# Patient Record
Sex: FEMALE | Race: WHITE
[De-identification: names, ages, dates, MRNs, and addresses within clinical notes are randomized per-mention and may not be internally consistent; named-entity substitution may affect disease eponyms.]

---
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
## 2020-08-13 ENCOUNTER — HOSPITAL ENCOUNTER (EMERGENCY)
Dept: HOSPITAL 47 - EC | Age: 20
Discharge: NURSING, REHAB OR LONG-TERM CARE | End: 2020-08-13
Payer: COMMERCIAL

## 2020-08-13 VITALS — SYSTOLIC BLOOD PRESSURE: 111 MMHG | DIASTOLIC BLOOD PRESSURE: 74 MMHG | HEART RATE: 70 BPM | TEMPERATURE: 98.1 F

## 2020-08-13 VITALS — RESPIRATION RATE: 18 BRPM
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
## 2020-08-13 DIAGNOSIS — S63.635A: Primary | ICD-10-CM

## 2020-08-13 DIAGNOSIS — F17.200: ICD-10-CM
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
## 2020-08-13 PROCEDURE — 99284 EMERGENCY DEPT VISIT MOD MDM: CPT

## 2020-08-13 NOTE — XR
EXAMINATION TYPE: XR hand complete LT

 

DATE OF EXAM: 8/13/2020

 

COMPARISON: NONE

 

HISTORY: Pain

 

TECHNIQUE: Three views are submitted.

 

FINDINGS:

The osseous structures are intact.  The joint spaces are preserved and there is no acute fracture or 
dislocation.  

 

IMPRESSION:

1.  No definite acute fracture or dislocation if symptoms persist, follow-up study in 7 to 10 days wo
uld be suggested

## 2020-08-13 NOTE — ED
General Adult HPI





- General


Chief complaint: Extremity Injury, Upper


Stated complaint: Hand injury


Time Seen by Provider: 08/13/20 12:15


Source: patient, RN notes reviewed, old records reviewed


Mode of arrival: ambulatory


Limitations: no limitations





- History of Present Illness


Initial comments: 





This is a 19-year-old female who was riding a hoverboard with her kids and she 

fell off it and hurt her left hand.  Patient complains of pain at the fourth 

metacarpal complains of pain at the proximal PIP joint of the fourth finger.  

Patient has a small abrasion on the PIP joint of the fourth finger as well.  

Patient denies any wrist pain.  Patient denies any other injury.  Patient denies

any head or have any neck pain.





- Related Data


                                Home Medications











 Medication  Instructions  Recorded  Confirmed


 


Acetaminophen with Codeine 1 tab PO Q6H PRN 12/18/19 12/18/19





[Tylenol w/codeine #3]   








                                  Previous Rx's











 Medication  Instructions  Recorded


 


Ibuprofen [Motrin] 600 mg PO Q6HR PRN #20 tab 08/13/20











                                    Allergies











Allergy/AdvReac Type Severity Reaction Status Date / Time


 


No Known Allergies Allergy   Verified 08/13/20 12:16














Review of Systems


ROS Statement: 


Those systems with pertinent positive or pertinent negative responses have been 

documented in the HPI.





ROS Other: All systems not noted in ROS Statement are negative.





Past Medical History


Past Medical History: No Reported History


History of Any Multi-Drug Resistant Organisms: None Reported


Past Surgical History: No Surgical Hx Reported


Past Psychological History: No Psychological Hx Reported


Smoking Status: Current every day smoker


Past Alcohol Use History: None Reported


Past Drug Use History: None Reported





General Exam





- General Exam Comments


Initial Comments: 





GENERAL 


Patient is well-developed and well-nourished.  Patient is in mild distress.





EYES


Patient's pupils are equal and round.  Extraocular motion is intact





SKIN


There is a very superficial abrasion to the PIP joint of the fourth finger on 

the left hand





NEURO


The patient is alert and oriented 3





PYSCH


Patient has normal interpersonal interactions.





MUSCULOSKELETAL


Patient's tenderness at the fourth PIP joint of the left hand patient also has 

some tenderness over the fourth metacarpal.


Limitations: no limitations





Course


                                   Vital Signs











  08/13/20 08/13/20





  12:14 14:02


 


Temperature 98.2 F 98.1 F


 


Pulse Rate 75 70


 


Respiratory 18 18





Rate  


 


Blood Pressure 120/75 111/74


 


O2 Sat by Pulse 100 100





Oximetry  














Medical Decision Making





- Medical Decision Making





X-ray of the hand shows no fracture of the fingers or the metacarpals.  Patient 

will have a splint placed on the fourth finger because of the pain at the PIP 

joint that's proximal.  Unable to test ligament laxity of the joint because the 

patient is having somewhat discomfort.





Disposition


Clinical Impression: 


 Finger sprain





Disposition: HOME SELF-CARE


Instructions (If sedation given, give patient instructions):  Hand Sprain (ED)


Prescriptions: 


Ibuprofen [Motrin] 600 mg PO Q6HR PRN #20 tab


 PRN Reason: For pain   


Is patient prescribed a controlled substance at d/c from ED?: No


Referrals: 


Dom Celaya MD [Primary Care Provider] - 1-2 days


Time of Disposition: 13:34

## 2021-05-23 ENCOUNTER — HOSPITAL ENCOUNTER (EMERGENCY)
Dept: HOSPITAL 47 - EC | Age: 21
Discharge: HOME | End: 2021-05-23
Payer: COMMERCIAL

## 2021-05-23 VITALS — HEART RATE: 97 BPM | TEMPERATURE: 98.4 F | DIASTOLIC BLOOD PRESSURE: 87 MMHG | SYSTOLIC BLOOD PRESSURE: 118 MMHG

## 2021-05-23 VITALS — RESPIRATION RATE: 18 BRPM

## 2021-05-23 DIAGNOSIS — T40.2X1A: Primary | ICD-10-CM

## 2021-05-23 PROCEDURE — 99284 EMERGENCY DEPT VISIT MOD MDM: CPT

## 2021-05-23 NOTE — ED
Overdose HPI





- General


Chief Complaint: Overdose


Stated Complaint: Overdose


Time Seen by Provider: 05/23/21 05:06


Source: patient


Mode of arrival: ambulatory


Limitations: no limitations





- Related Data


                                Home Medications











 Medication  Instructions  Recorded  Confirmed


 


Acetaminophen with Codeine 1 tab PO Q6H PRN 12/18/19 12/18/19





[Tylenol w/codeine #3]   








                                  Previous Rx's











 Medication  Instructions  Recorded


 


Ibuprofen [Motrin] 600 mg PO Q6HR PRN #20 tab 08/13/20











                                    Allergies











Allergy/AdvReac Type Severity Reaction Status Date / Time


 


No Known Allergies Allergy   Verified 05/23/21 05:06














Review of Systems


ROS Statement: 


Those systems with pertinent positive or pertinent negative responses have been 

documented in the HPI.





ROS Other: All systems not noted in ROS Statement are negative.





Past Medical History


Past Medical History: No Reported History


History of Any Multi-Drug Resistant Organisms: None Reported


Past Surgical History: No Surgical Hx Reported


Past Psychological History: No Psychological Hx Reported


Smoking Status: Current every day smoker


Past Alcohol Use History: None Reported


Past Drug Use History: Opiates, Prescription Drug Abuse





General Exam


Limitations: no limitations





Course





                                   Vital Signs











  05/23/21





  05:03


 


Temperature 98.6 F


 


Pulse Rate 94


 


Respiratory 18





Rate 


 


Blood Pressure 138/100


 


O2 Sat by Pulse 99





Oximetry 














Disposition


Clinical Impression: 


 Accidental drug overdose, Drug overdose, Poisoning by opiates and related 

narcotics, other





Disposition: HOME SELF-CARE


Condition: Good


Instructions (If sedation given, give patient instructions):  Adult Overdose 

(ED)


Is patient prescribed a controlled substance at d/c from ED?: No


Referrals: 


Dom Celaya MD [Primary Care Provider] - 1-2 days

## 2021-10-18 ENCOUNTER — HOSPITAL ENCOUNTER (EMERGENCY)
Dept: HOSPITAL 47 - EC | Age: 21
LOS: 1 days | Discharge: HOME | End: 2021-10-19
Payer: COMMERCIAL

## 2021-10-18 DIAGNOSIS — L25.9: Primary | ICD-10-CM

## 2021-10-18 DIAGNOSIS — T50.905A: ICD-10-CM

## 2021-10-18 DIAGNOSIS — Z72.89: ICD-10-CM

## 2021-10-18 DIAGNOSIS — F11.10: ICD-10-CM

## 2021-10-18 DIAGNOSIS — F17.200: ICD-10-CM

## 2021-10-18 DIAGNOSIS — L50.9: ICD-10-CM

## 2021-10-18 PROCEDURE — 99282 EMERGENCY DEPT VISIT SF MDM: CPT

## 2021-10-19 VITALS
SYSTOLIC BLOOD PRESSURE: 134 MMHG | TEMPERATURE: 97.9 F | DIASTOLIC BLOOD PRESSURE: 84 MMHG | RESPIRATION RATE: 18 BRPM | HEART RATE: 78 BPM

## 2021-10-19 NOTE — ED
Skin/Abscess/FB HPI





- General


Chief complaint: Skin/Abscess/Foreign Body


Stated complaint: rash


Time Seen by Provider: 10/18/21 23:46


Source: patient, RN notes reviewed, old records reviewed


Mode of arrival: ambulatory


Limitations: no limitations





- History of Present Illness


Initial comments: 





This is a 20-year-old female DF for evaluation of diffuse body rash.  Started 

this morning is progressed throughout the day.  No modifying factors at home no 

new medications no change in symptoms exposures or rash.  No recent camping or 

travel history.  No other complaints


MD complaint: rash


-: days(s)


Tetanus Up to Date: yes


Location: generalized


Severity: moderate


Severity scale (1-10): 4


Quality: aching


Consistency: constant


Improves with: none


Worsens with: none


Context: none


Associated symptoms: denies other symptoms


Treatments Prior to Arrival: none





- Related Data


                                Home Medications











 Medication  Instructions  Recorded  Confirmed


 


Acetaminophen with Codeine 1 tab PO Q6H PRN 12/18/19 12/18/19





[Tylenol w/codeine #3]   








                                  Previous Rx's











 Medication  Instructions  Recorded


 


Ibuprofen [Motrin] 600 mg PO Q6HR PRN #20 tab 08/13/20


 


Famotidine [Pepcid] 40 mg PO BID #28 tablet 10/19/21


 


hydrOXYzine HCL [Atarax] 25 mg PO TID PRN #15 tab 10/19/21


 


predniSONE 50 mg PO DAILY #5 tab 10/19/21











                                    Allergies











Allergy/AdvReac Type Severity Reaction Status Date / Time


 


No Known Allergies Allergy   Verified 10/18/21 23:55














Review of Systems


ROS Statement: 


Those systems with pertinent positive or pertinent negative responses have been 

documented in the HPI.





ROS Other: All systems not noted in ROS Statement are negative.





Past Medical History


Past Medical History: No Reported History


History of Any Multi-Drug Resistant Organisms: None Reported


Past Surgical History: No Surgical Hx Reported


Past Psychological History: No Psychological Hx Reported


Smoking Status: Current every day smoker


Past Alcohol Use History: Occasional


Past Drug Use History: Opiates, Prescription Drug Abuse





General Exam


Limitations: no limitations


General appearance: alert, in no apparent distress


Head exam: Present: atraumatic, normocephalic, normal inspection


Eye exam: Present: normal appearance, PERRL, EOMI.  Absent: scleral icterus, 

conjunctival injection, periorbital swelling


ENT exam: Present: normal exam, mucous membranes moist


Neck exam: Present: normal inspection.  Absent: tenderness, meningismus, 

lymphadenopathy


Respiratory exam: Present: normal lung sounds bilaterally.  Absent: respiratory 

distress, wheezes, rales, rhonchi, stridor


Cardiovascular Exam: Present: regular rate, normal rhythm, normal heart sounds. 

 Absent: systolic murmur, diastolic murmur, rubs, gallop, clicks


GI/Abdominal exam: Present: soft, normal bowel sounds.  Absent: distended, 

tenderness, guarding, rebound, rigid


Extremities exam: Present: normal inspection, full ROM, normal capillary refill.

  Absent: tenderness, pedal edema, joint swelling, calf tenderness


Back exam: Present: normal inspection


Neurological exam: Present: alert, oriented X3, CN II-XII intact


Psychiatric exam: Present: normal affect, normal mood


Skin exam: Present: warm, dry, intact, normal color.  Absent: rash





Course





                                   Vital Signs











  10/18/21





  23:52


 


Temperature 98.7 F


 


Pulse Rate 119 H


 


Respiratory 20





Rate 


 


Blood Pressure 137/79


 


O2 Sat by Pulse 96





Oximetry 














- Reevaluation(s)


Reevaluation #1: 





10/19/21 00:27


Medical record is reviewed


Reevaluation #2: 





10/19/21 00:27


Patient symptoms are improved


Reevaluation #3: 





10/19/21 00:27


Patient informed results and questions answered





Medical Decision Making





- Medical Decision Making





20-year-old female DF for evaluation.  Body rash urticarial ALLERGIC reaction.  

Patient placed on anti-inflammatories antihistamines and can be discharged home





Disposition


Clinical Impression: 


 Allergic reaction to drug, Contact dermatitis, Urticaria





Disposition: HOME SELF-CARE


Condition: Good


Instructions (If sedation given, give patient instructions):  Urticaria (ED)


Prescriptions: 


hydrOXYzine HCL [Atarax] 25 mg PO TID PRN #15 tab


 PRN Reason: Itching


Famotidine [Pepcid] 40 mg PO BID #28 tablet


predniSONE 50 mg PO DAILY #5 tab


Is patient prescribed a controlled substance at d/c from ED?: No


Referrals: 


Dom Celaya MD [Primary Care Provider] - 1-2 days

## 2021-10-20 ENCOUNTER — HOSPITAL ENCOUNTER (EMERGENCY)
Dept: HOSPITAL 47 - EC | Age: 21
Discharge: HOME | End: 2021-10-20
Payer: COMMERCIAL

## 2021-10-20 VITALS
RESPIRATION RATE: 18 BRPM | HEART RATE: 127 BPM | SYSTOLIC BLOOD PRESSURE: 122 MMHG | DIASTOLIC BLOOD PRESSURE: 70 MMHG | TEMPERATURE: 97.8 F

## 2021-10-20 DIAGNOSIS — L23.9: Primary | ICD-10-CM

## 2021-10-20 DIAGNOSIS — Z72.89: ICD-10-CM

## 2021-10-20 DIAGNOSIS — F17.200: ICD-10-CM

## 2021-10-20 PROCEDURE — 99282 EMERGENCY DEPT VISIT SF MDM: CPT

## 2021-10-20 PROCEDURE — 96372 THER/PROPH/DIAG INJ SC/IM: CPT

## 2021-10-20 NOTE — ED
Skin/Abscess/FB HPI





- General


Chief complaint: Skin/Abscess/Foreign Body


Stated complaint: revisit-allergic reaction


Time Seen by Provider: 10/20/21 06:15


Source: patient, RN notes reviewed


Mode of arrival: ambulatory


Limitations: no limitations





- History of Present Illness


Initial comments: 





Patient is a 20-year-old female that presents to emergency department 

complaining of a diffuse urticarial rash.  She was seen yesterday was sent home 

with prednisone famotidine and Benadryl for ALLERGIC reaction.  She notes that 

the rash went away after the first doses of medication.  She notes that she came

back today as it is still present.  Patient was informed that it takes several 

days for medication a fully work.  She notes that she has not having difficulty 

breathing swollen talking.  She was in no apparent distress or pain.  She denied

any chest pain shortness of breath headache nausea vomiting diarrhea 

constipation fever fatigue chills.





- Related Data


                                Home Medications











 Medication  Instructions  Recorded  Confirmed


 


Acetaminophen with Codeine 1 tab PO Q6H PRN 12/18/19 12/18/19





[Tylenol w/codeine #3]   








                                  Previous Rx's











 Medication  Instructions  Recorded


 


Ibuprofen [Motrin] 600 mg PO Q6HR PRN #20 tab 08/13/20


 


Famotidine [Pepcid] 40 mg PO BID #28 tablet 10/19/21


 


hydrOXYzine HCL [Atarax] 25 mg PO TID PRN #15 tab 10/19/21


 


predniSONE 50 mg PO DAILY #5 tab 10/19/21











                                    Allergies











Allergy/AdvReac Type Severity Reaction Status Date / Time


 


No Known Allergies Allergy   Verified 10/18/21 23:55














Review of Systems


ROS Statement: 


Those systems with pertinent positive or pertinent negative responses have been 

documented in the HPI.





ROS Other: All systems not noted in ROS Statement are negative.





Past Medical History


Past Medical History: No Reported History


History of Any Multi-Drug Resistant Organisms: None Reported


Past Surgical History: No Surgical Hx Reported


Past Psychological History: No Psychological Hx Reported


Smoking Status: Current every day smoker


Past Alcohol Use History: Occasional


Past Drug Use History: Opiates, Prescription Drug Abuse





General Exam


Limitations: no limitations


General appearance: alert, in no apparent distress


Head exam: Present: atraumatic, normocephalic, normal inspection


Eye exam: Present: normal appearance, PERRL, EOMI.  Absent: scleral icterus, 

conjunctival injection, periorbital swelling


ENT exam: Present: normal exam, mucous membranes moist


Neck exam: Present: normal inspection


Respiratory exam: Present: normal lung sounds bilaterally.  Absent: respiratory 

distress, wheezes, rales, rhonchi, stridor


Cardiovascular Exam: Present: regular rate, normal rhythm, normal heart sounds. 

 Absent: systolic murmur, diastolic murmur, rubs, gallop, clicks


Extremities exam: Present: normal inspection, full ROM, normal capillary refill.

  Absent: tenderness, pedal edema, joint swelling, calf tenderness


Neurological exam: Present: alert, oriented X3


Psychiatric exam: Present: normal affect, normal mood


Skin exam: Present: warm, dry, intact, normal color, urticaria (Covering upper 

extremities trunk and face, nontender).  Absent: rash





Course


                                   Vital Signs











  10/20/21





  06:07


 


Temperature 97.8 F


 


Pulse Rate 127 H


 


Respiratory 18





Rate 


 


Blood Pressure 122/70


 


O2 Sat by Pulse 98





Oximetry 














Medical Decision Making





- Medical Decision Making





20-year-old female complaining of urticarial rash.  Seen yesterday.


Patient was informed that the medications don't act immediately and it may take 

several days to weeks for rash to fully dissipate.


125 mg Solu-Medrol ordered.


Patient is agreeable with discharge home and continuation of medications with 

follow-up primary care.


case discussed with Dr. Gallagher, patient can discharge home. 





Disposition


Clinical Impression: 


 Contact dermatitis, Urticaria





Disposition: HOME SELF-CARE


Condition: Stable


Instructions (If sedation given, give patient instructions):  Urticaria (ED)


Additional Instructions: 


Please return to the Emergency Department if symptoms worsen or any other 

concerns.


Follow-up with primary care 1-2 days.


Continue medications as prescribed.


Rash may take several days to weeks to fully dissipate.





Is patient prescribed a controlled substance at d/c from ED?: No


Referrals: 


Dom Celaya MD [Primary Care Provider] - 1-2 days


Time of Disposition: 07:08

## 2021-10-25 ENCOUNTER — HOSPITAL ENCOUNTER (OUTPATIENT)
Dept: HOSPITAL 47 - LABWHC1 | Age: 21
Discharge: HOME | End: 2021-10-25
Attending: ALLERGY & IMMUNOLOGY
Payer: COMMERCIAL

## 2021-10-25 DIAGNOSIS — L50.9: Primary | ICD-10-CM

## 2021-10-25 LAB
BASOPHILS # BLD AUTO: 0.05 X 10*3/UL (ref 0–0.1)
BASOPHILS NFR BLD AUTO: 0.3 %
EOSINOPHIL # BLD AUTO: 0.09 X 10*3/UL (ref 0.04–0.35)
EOSINOPHIL NFR BLD AUTO: 0.6 %
ERYTHROCYTE [DISTWIDTH] IN BLOOD BY AUTOMATED COUNT: 4.56 X 10*6/UL (ref 4.1–5.2)
ERYTHROCYTE [DISTWIDTH] IN BLOOD: 13.2 % (ref 11.5–14.5)
HCT VFR BLD AUTO: 42.9 % (ref 37.2–46.3)
HGB BLD-MCNC: 14.8 G/DL (ref 12–15)
LYMPHOCYTES # SPEC AUTO: 4.82 X 10*3/UL (ref 0.9–5)
LYMPHOCYTES NFR SPEC AUTO: 33.7 %
MCH RBC QN AUTO: 32.5 PG (ref 27–32)
MCHC RBC AUTO-ENTMCNC: 34.5 G/DL (ref 32–37)
MCV RBC AUTO: 94.1 FL (ref 80–97)
MONOCYTES # BLD AUTO: 0.9 X 10*3/UL (ref 0.2–1)
MONOCYTES NFR BLD AUTO: 6.3 %
NEUTROPHILS # BLD AUTO: 8.33 X 10*3/UL (ref 1.8–7.7)
NEUTROPHILS NFR BLD AUTO: 58.3 %
PLATELET # BLD AUTO: 288 X 10*3/UL (ref 140–440)
WBC # BLD AUTO: 14.31 X 10*3/UL (ref 4.5–10)

## 2021-10-25 PROCEDURE — 36415 COLL VENOUS BLD VENIPUNCTURE: CPT

## 2021-10-25 PROCEDURE — 86038 ANTINUCLEAR ANTIBODIES: CPT

## 2021-10-25 PROCEDURE — 85025 COMPLETE CBC W/AUTO DIFF WBC: CPT

## 2021-10-25 PROCEDURE — 84443 ASSAY THYROID STIM HORMONE: CPT

## 2021-10-25 PROCEDURE — 80053 COMPREHEN METABOLIC PANEL: CPT

## 2021-10-26 LAB
ALBUMIN SERPL-MCNC: 4.3 G/DL (ref 3.8–4.9)
ALBUMIN/GLOB SERPL: 1.87 G/DL (ref 1.6–3.17)
ALP SERPL-CCNC: 89 U/L (ref 41–126)
ALT SERPL-CCNC: 11 U/L (ref 8–44)
ANION GAP SERPL CALC-SCNC: 13.9 MMOL/L (ref 4–12)
AST SERPL-CCNC: 16 U/L (ref 13–35)
BUN SERPL-SCNC: 6.3 MG/DL (ref 9–27)
BUN/CREAT SERPL: 10.5 RATIO (ref 12–20)
CALCIUM SPEC-MCNC: 9.1 MG/DL (ref 8.7–10.3)
CHLORIDE SERPL-SCNC: 102 MMOL/L (ref 96–109)
CO2 SERPL-SCNC: 23.1 MMOL/L (ref 21.6–31.8)
GLOBULIN SER CALC-MCNC: 2.3 G/DL (ref 1.6–3.3)
GLUCOSE SERPL-MCNC: 99 MG/DL (ref 70–110)
POTASSIUM SERPL-SCNC: 3.2 MMOL/L (ref 3.5–5.5)
PROT SERPL-MCNC: 6.6 G/DL (ref 6.2–8.2)
SODIUM SERPL-SCNC: 139 MMOL/L (ref 135–145)

## 2021-11-15 ENCOUNTER — HOSPITAL ENCOUNTER (EMERGENCY)
Dept: HOSPITAL 47 - EC | Age: 21
LOS: 1 days | Discharge: HOME | End: 2021-11-16
Payer: COMMERCIAL

## 2021-11-15 VITALS
RESPIRATION RATE: 18 BRPM | SYSTOLIC BLOOD PRESSURE: 114 MMHG | TEMPERATURE: 98.7 F | HEART RATE: 83 BPM | DIASTOLIC BLOOD PRESSURE: 72 MMHG

## 2021-11-15 DIAGNOSIS — Z79.52: ICD-10-CM

## 2021-11-15 DIAGNOSIS — Z79.1: ICD-10-CM

## 2021-11-15 DIAGNOSIS — F17.200: ICD-10-CM

## 2021-11-15 DIAGNOSIS — M94.0: Primary | ICD-10-CM

## 2021-11-15 PROCEDURE — 93005 ELECTROCARDIOGRAM TRACING: CPT

## 2021-11-15 PROCEDURE — 99285 EMERGENCY DEPT VISIT HI MDM: CPT

## 2021-11-15 PROCEDURE — 71046 X-RAY EXAM CHEST 2 VIEWS: CPT

## 2021-11-15 NOTE — XR
EXAMINATION TYPE: XR chest 2V

 

DATE OF EXAM: 11/15/2021

 

COMPARISON: NONE

 

HISTORY: Chest pain

 

TECHNIQUE: 2 views

 

FINDINGS: Heart and mediastinum are normal. Lungs are clear. Diaphragm is normal. Bony thorax appears
 normal.

 

IMPRESSION: Normal chest. No change.

## 2021-11-16 NOTE — ED
Chest Pain HPI





- General


Chief Complaint: Chest Pain


Stated Complaint: Chest Pain


Time Seen by Provider: 11/16/21 01:55


Source: patient, RN notes reviewed


Mode of arrival: ambulatory


Limitations: no limitations





- History of Present Illness


Initial Comments: 





Patient is a 20-year-old female presenting to emergency Department with 

complaints of chest pain that started when she woke up this morning about noon. 

She states it has been intermittent, hurts worse when she moves around or when 

she pushes on her chest.  She denies any cough or congestion, no fevers or 

chills.  He denies any shortness of breath, she denies any falls or injuries.  

She states she has been watching her nephew a lot and been picking him up more 

frequently.  She denies a family history of heart disease.  She is not pregnant.

 She has no history of blood clots.  She takes no medications.  She has no 

further complaints.  Her vital signs are stable upon arrival.





- Related Data


                                Home Medications











 Medication  Instructions  Recorded  Confirmed


 


Acetaminophen with Codeine 1 tab PO Q6H PRN 12/18/19 12/18/19





[Tylenol w/codeine #3]   








                                  Previous Rx's











 Medication  Instructions  Recorded


 


Ibuprofen [Motrin] 600 mg PO Q6HR PRN #20 tab 08/13/20


 


Famotidine [Pepcid] 40 mg PO BID #28 tablet 10/19/21


 


hydrOXYzine HCL [Atarax] 25 mg PO TID PRN #15 tab 10/19/21


 


predniSONE 50 mg PO DAILY #5 tab 10/19/21











                                    Allergies











Allergy/AdvReac Type Severity Reaction Status Date / Time


 


No Known Allergies Allergy   Verified 11/15/21 23:29














Review of Systems


ROS Statement: 


Those systems with pertinent positive or pertinent negative responses have been 

documented in the HPI.





ROS Other: All systems not noted in ROS Statement are negative.





EKG Findings





- EKG Comments:


EKG Findings:: Normal sinus rhythm, possible anterior infarct, age undetermined,

no signs of acute ST segment elevation.  Ventricular rate 74, SC interval 140, 

.  Similar to previous on 03/12/2018.





Past Medical History


Past Medical History: No Reported History


History of Any Multi-Drug Resistant Organisms: None Reported


Past Surgical History: No Surgical Hx Reported


Past Psychological History: No Psychological Hx Reported


Smoking Status: Current every day smoker


Past Alcohol Use History: Occasional


Past Drug Use History: Opiates, Prescription Drug Abuse





General Exam





- General Exam Comments


Initial Comments: 





GENERAL: 


Patient is well-developed and well-nourished.  Patient is nontoxic and in no 

acute distress.





HEAD: 


Atraumatic, normocephalic.





EYES:


Pupils equal round and reactive to light, extraocular movements intact, sclera 

anicteric, conjunctiva are normal.  Eyelids were unremarkable.





ENT: 


Moist mucous membranes.





NECK: 


Normal range of motion, supple without lymphadenopathy or JVD.





LUNGS:


Unlabored respirations.  Breath sounds clear to auscultation bilaterally and 

equal.  No wheezes rales or rhonchi.





HEART:


Regular rate and rhythm without murmurs, rubs or gallops.





MUSCULOSKELETAL: 


Normal extremities with adequate strength and normal range of motion, no pitting

or edema.  No clubbing or cyanosis.  She has pain with palpation of the anterior

sternum, she has pain with trunk rotation as well.





SKIN:


 Warm, Dry, normal turgor, no rashes or lesions noted.


Limitations: no limitations





Course





                                   Vital Signs











  11/15/21





  23:27


 


Temperature 98.7 F


 


Pulse Rate 83


 


Respiratory 18





Rate 


 


Blood Pressure 114/72


 


O2 Sat by Pulse 99





Oximetry 














Chest Pain [facility]





- [facility]





Patient is a 20-year-old female presenting with intermittent chest discomfort 

that started when she woke up this morning.  Her EKG shows normal sinus rhythm, 

chest x-ray showed no acute process.  She has no fevers, vitals are stable.  Her

exam is consistent with costochondritis.  Recommended anti-inflammatories.  She 

is requesting a work note.  She stable for discharge.  Follow up with the 

primary care.





Disposition


Clinical Impression: 


 Atypical chest pain, Costochondritis





Disposition: HOME SELF-CARE


Condition: Stable


Instructions (If sedation given, give patient instructions):  Costochondritis 

(ED)


Additional Instructions: 


Please return to the Emergency Department if symptoms worsen or any other 

concerns.


Recommend anti-inflammatory such as Motrin or Aleve for discomfort.  You may 

apply ice or heat to the area.  


Follow-up with your primary care.


Is patient prescribed a controlled substance at d/c from ED?: No


Referrals: 


Dom Celaya MD [Primary Care Provider] - 1-2 days


Time of Disposition: 02:20